# Patient Record
Sex: MALE | Race: WHITE | NOT HISPANIC OR LATINO | Employment: UNEMPLOYED | URBAN - METROPOLITAN AREA
[De-identification: names, ages, dates, MRNs, and addresses within clinical notes are randomized per-mention and may not be internally consistent; named-entity substitution may affect disease eponyms.]

---

## 2024-08-08 ENCOUNTER — TELEPHONE (OUTPATIENT)
Dept: FAMILY MEDICINE | Facility: CLINIC | Age: 59
End: 2024-08-08
Payer: MEDICARE

## 2024-08-09 ENCOUNTER — OFFICE VISIT (OUTPATIENT)
Dept: FAMILY MEDICINE | Facility: CLINIC | Age: 59
End: 2024-08-09
Payer: MEDICARE

## 2024-08-09 VITALS
TEMPERATURE: 98 F | DIASTOLIC BLOOD PRESSURE: 70 MMHG | OXYGEN SATURATION: 95 % | HEART RATE: 91 BPM | WEIGHT: 126.75 LBS | BODY MASS INDEX: 17.17 KG/M2 | SYSTOLIC BLOOD PRESSURE: 122 MMHG | HEIGHT: 72 IN

## 2024-08-09 DIAGNOSIS — F51.01 PRIMARY INSOMNIA: ICD-10-CM

## 2024-08-09 DIAGNOSIS — Z12.5 ENCOUNTER FOR SCREENING FOR MALIGNANT NEOPLASM OF PROSTATE: ICD-10-CM

## 2024-08-09 DIAGNOSIS — C61 PROSTATE CA: ICD-10-CM

## 2024-08-09 DIAGNOSIS — Z72.0 TOBACCO ABUSE: ICD-10-CM

## 2024-08-09 DIAGNOSIS — R79.9 ABNORMAL FINDING OF BLOOD CHEMISTRY, UNSPECIFIED: ICD-10-CM

## 2024-08-09 DIAGNOSIS — J44.9 CHRONIC OBSTRUCTIVE PULMONARY DISEASE, UNSPECIFIED COPD TYPE: Primary | ICD-10-CM

## 2024-08-09 PROCEDURE — 99999 PR PBB SHADOW E&M-EST. PATIENT-LVL III: CPT | Mod: PBBFAC,,, | Performed by: NURSE PRACTITIONER

## 2024-08-09 RX ORDER — CLONAZEPAM 1 MG/1
1 TABLET ORAL DAILY
Qty: 30 TABLET | Refills: 2 | Status: SHIPPED | OUTPATIENT
Start: 2024-08-09

## 2024-08-09 RX ORDER — CLONAZEPAM 1 MG/1
1 TABLET ORAL DAILY
COMMUNITY
End: 2024-08-09 | Stop reason: SDUPTHER

## 2024-08-16 ENCOUNTER — LAB VISIT (OUTPATIENT)
Dept: LAB | Facility: HOSPITAL | Age: 59
End: 2024-08-16
Attending: NURSE PRACTITIONER
Payer: MEDICARE

## 2024-08-16 DIAGNOSIS — R79.9 ABNORMAL FINDING OF BLOOD CHEMISTRY, UNSPECIFIED: ICD-10-CM

## 2024-08-16 DIAGNOSIS — C61 PROSTATE CA: ICD-10-CM

## 2024-08-16 DIAGNOSIS — Z72.0 TOBACCO ABUSE: ICD-10-CM

## 2024-08-16 DIAGNOSIS — Z12.5 ENCOUNTER FOR SCREENING FOR MALIGNANT NEOPLASM OF PROSTATE: ICD-10-CM

## 2024-08-16 LAB
ALBUMIN SERPL BCP-MCNC: 3.5 G/DL (ref 3.5–5.2)
ALP SERPL-CCNC: 84 U/L (ref 55–135)
ALT SERPL W/O P-5'-P-CCNC: 10 U/L (ref 10–44)
ANION GAP SERPL CALC-SCNC: 10 MMOL/L (ref 8–16)
AST SERPL-CCNC: 14 U/L (ref 10–40)
BASOPHILS # BLD AUTO: 0.04 K/UL (ref 0–0.2)
BASOPHILS NFR BLD: 0.7 % (ref 0–1.9)
BILIRUB SERPL-MCNC: 0.3 MG/DL (ref 0.1–1)
BUN SERPL-MCNC: 10 MG/DL (ref 6–20)
CALCIUM SERPL-MCNC: 9.3 MG/DL (ref 8.7–10.5)
CHLORIDE SERPL-SCNC: 105 MMOL/L (ref 95–110)
CHOLEST SERPL-MCNC: 188 MG/DL (ref 120–199)
CHOLEST/HDLC SERPL: 4.5 {RATIO} (ref 2–5)
CO2 SERPL-SCNC: 24 MMOL/L (ref 23–29)
COMPLEXED PSA SERPL-MCNC: 2.5 NG/ML (ref 0–4)
CREAT SERPL-MCNC: 1.1 MG/DL (ref 0.5–1.4)
DIFFERENTIAL METHOD BLD: ABNORMAL
EOSINOPHIL # BLD AUTO: 0.1 K/UL (ref 0–0.5)
EOSINOPHIL NFR BLD: 1.3 % (ref 0–8)
ERYTHROCYTE [DISTWIDTH] IN BLOOD BY AUTOMATED COUNT: 12.7 % (ref 11.5–14.5)
EST. GFR  (NO RACE VARIABLE): >60 ML/MIN/1.73 M^2
GLUCOSE SERPL-MCNC: 104 MG/DL (ref 70–110)
HCT VFR BLD AUTO: 45.7 % (ref 40–54)
HCV AB SERPL QL IA: NORMAL
HDLC SERPL-MCNC: 42 MG/DL (ref 40–75)
HDLC SERPL: 22.3 % (ref 20–50)
HGB BLD-MCNC: 15.4 G/DL (ref 14–18)
HIV 1+2 AB+HIV1 P24 AG SERPL QL IA: NORMAL
IMM GRANULOCYTES # BLD AUTO: 0.01 K/UL (ref 0–0.04)
IMM GRANULOCYTES NFR BLD AUTO: 0.2 % (ref 0–0.5)
LDLC SERPL CALC-MCNC: 124.6 MG/DL (ref 63–159)
LYMPHOCYTES # BLD AUTO: 1.2 K/UL (ref 1–4.8)
LYMPHOCYTES NFR BLD: 20.3 % (ref 18–48)
MCH RBC QN AUTO: 33.2 PG (ref 27–31)
MCHC RBC AUTO-ENTMCNC: 33.7 G/DL (ref 32–36)
MCV RBC AUTO: 99 FL (ref 82–98)
MONOCYTES # BLD AUTO: 0.7 K/UL (ref 0.3–1)
MONOCYTES NFR BLD: 11.2 % (ref 4–15)
NEUTROPHILS # BLD AUTO: 4 K/UL (ref 1.8–7.7)
NEUTROPHILS NFR BLD: 66.3 % (ref 38–73)
NONHDLC SERPL-MCNC: 146 MG/DL
NRBC BLD-RTO: 0 /100 WBC
PLATELET # BLD AUTO: 119 K/UL (ref 150–450)
PMV BLD AUTO: 11.6 FL (ref 9.2–12.9)
POTASSIUM SERPL-SCNC: 3.7 MMOL/L (ref 3.5–5.1)
PROT SERPL-MCNC: 6.5 G/DL (ref 6–8.4)
RBC # BLD AUTO: 4.64 M/UL (ref 4.6–6.2)
SODIUM SERPL-SCNC: 139 MMOL/L (ref 136–145)
TRIGL SERPL-MCNC: 107 MG/DL (ref 30–150)
WBC # BLD AUTO: 6.07 K/UL (ref 3.9–12.7)

## 2024-08-16 PROCEDURE — 80061 LIPID PANEL: CPT | Performed by: NURSE PRACTITIONER

## 2024-08-16 PROCEDURE — 86803 HEPATITIS C AB TEST: CPT | Performed by: NURSE PRACTITIONER

## 2024-08-16 PROCEDURE — 80053 COMPREHEN METABOLIC PANEL: CPT | Performed by: NURSE PRACTITIONER

## 2024-08-16 PROCEDURE — 84153 ASSAY OF PSA TOTAL: CPT | Performed by: NURSE PRACTITIONER

## 2024-08-16 PROCEDURE — 85025 COMPLETE CBC W/AUTO DIFF WBC: CPT | Performed by: NURSE PRACTITIONER

## 2024-08-16 PROCEDURE — 36415 COLL VENOUS BLD VENIPUNCTURE: CPT | Mod: PO | Performed by: NURSE PRACTITIONER

## 2024-08-16 PROCEDURE — 87389 HIV-1 AG W/HIV-1&-2 AB AG IA: CPT | Performed by: NURSE PRACTITIONER

## 2024-08-23 ENCOUNTER — LAB VISIT (OUTPATIENT)
Dept: LAB | Facility: HOSPITAL | Age: 59
End: 2024-08-23
Attending: FAMILY MEDICINE
Payer: MEDICARE

## 2024-08-23 ENCOUNTER — OFFICE VISIT (OUTPATIENT)
Dept: FAMILY MEDICINE | Facility: CLINIC | Age: 59
End: 2024-08-23
Attending: FAMILY MEDICINE
Payer: MEDICARE

## 2024-08-23 VITALS
HEIGHT: 72 IN | DIASTOLIC BLOOD PRESSURE: 76 MMHG | SYSTOLIC BLOOD PRESSURE: 120 MMHG | TEMPERATURE: 98 F | WEIGHT: 128.5 LBS | HEART RATE: 96 BPM | OXYGEN SATURATION: 93 % | BODY MASS INDEX: 17.41 KG/M2

## 2024-08-23 DIAGNOSIS — D69.6 THROMBOCYTOPENIA: ICD-10-CM

## 2024-08-23 DIAGNOSIS — Z85.46 HISTORY OF PROSTATE CANCER: ICD-10-CM

## 2024-08-23 DIAGNOSIS — I67.1 CEREBRAL ANEURYSM: ICD-10-CM

## 2024-08-23 DIAGNOSIS — D69.6 THROMBOCYTOPENIA: Primary | ICD-10-CM

## 2024-08-23 DIAGNOSIS — Z72.0 TOBACCO ABUSE: ICD-10-CM

## 2024-08-23 PROBLEM — M54.12 CERVICAL RADICULOPATHY: Status: ACTIVE | Noted: 2023-07-11

## 2024-08-23 PROBLEM — C61 PROSTATE CA: Status: RESOLVED | Noted: 2024-08-09 | Resolved: 2024-08-23

## 2024-08-23 PROBLEM — F17.200 TOBACCO DEPENDENCE SYNDROME: Status: ACTIVE | Noted: 2023-04-21

## 2024-08-23 PROBLEM — Z86.79 HISTORY OF CEREBRAL ANEURYSM: Status: ACTIVE | Noted: 2023-04-20

## 2024-08-23 PROBLEM — M47.816 LUMBAR SPONDYLOSIS: Status: ACTIVE | Noted: 2023-07-13

## 2024-08-23 PROBLEM — M54.16 LUMBAR RADICULOPATHY: Status: ACTIVE | Noted: 2023-07-11

## 2024-08-23 LAB
BASOPHILS # BLD AUTO: 0.04 K/UL (ref 0–0.2)
BASOPHILS NFR BLD: 0.6 % (ref 0–1.9)
DIFFERENTIAL METHOD BLD: ABNORMAL
EOSINOPHIL # BLD AUTO: 0.1 K/UL (ref 0–0.5)
EOSINOPHIL NFR BLD: 1.5 % (ref 0–8)
ERYTHROCYTE [DISTWIDTH] IN BLOOD BY AUTOMATED COUNT: 12.7 % (ref 11.5–14.5)
HCT VFR BLD AUTO: 49.3 % (ref 40–54)
HGB BLD-MCNC: 16.7 G/DL (ref 14–18)
IMM GRANULOCYTES # BLD AUTO: 0.02 K/UL (ref 0–0.04)
IMM GRANULOCYTES NFR BLD AUTO: 0.3 % (ref 0–0.5)
LYMPHOCYTES # BLD AUTO: 1.6 K/UL (ref 1–4.8)
LYMPHOCYTES NFR BLD: 24.6 % (ref 18–48)
MCH RBC QN AUTO: 33.2 PG (ref 27–31)
MCHC RBC AUTO-ENTMCNC: 33.9 G/DL (ref 32–36)
MCV RBC AUTO: 98 FL (ref 82–98)
MONOCYTES # BLD AUTO: 0.8 K/UL (ref 0.3–1)
MONOCYTES NFR BLD: 11.6 % (ref 4–15)
NEUTROPHILS # BLD AUTO: 4 K/UL (ref 1.8–7.7)
NEUTROPHILS NFR BLD: 61.4 % (ref 38–73)
NRBC BLD-RTO: 0 /100 WBC
PLATELET # BLD AUTO: 162 K/UL (ref 150–450)
PMV BLD AUTO: 11.6 FL (ref 9.2–12.9)
RBC # BLD AUTO: 5.03 M/UL (ref 4.6–6.2)
WBC # BLD AUTO: 6.54 K/UL (ref 3.9–12.7)

## 2024-08-23 PROCEDURE — 99999 PR PBB SHADOW E&M-EST. PATIENT-LVL III: CPT | Mod: PBBFAC,,, | Performed by: FAMILY MEDICINE

## 2024-08-23 PROCEDURE — 36415 COLL VENOUS BLD VENIPUNCTURE: CPT | Mod: PO | Performed by: FAMILY MEDICINE

## 2024-08-23 PROCEDURE — 85025 COMPLETE CBC W/AUTO DIFF WBC: CPT | Performed by: FAMILY MEDICINE

## 2024-08-23 NOTE — PROGRESS NOTES
Subjective:       Patient ID: Moe Loredo is a 59 y.o. male.    Chief Complaint: Establish Care    59 y old male with copd , hx of brain aneurysm, prostate ca here to get est . Not ready to quit smoking . He had aneurysm treated over 20 y ago . He last saw NS  last year . He did developed auditory hallucinations afterwards. He has not seen NS since . He also was diagnosed with prostate ca 10 y ago  . He sought alternative treatment with CBD oil,THC + Lemon oil . Currently in remission . He had a colonoscopy last year . He can't recall facility . No depression . Takes Clonazepam for insomnia . Eats very little . He doesn't take vaccinations. Recent lab results were  significant for thrombocytopenia       Review of Systems   Constitutional: Negative.    HENT: Negative.     Eyes: Negative.    Respiratory:  Positive for shortness of breath.    Cardiovascular: Negative.    Gastrointestinal: Negative.    Genitourinary: Negative.    Musculoskeletal: Negative.    Skin: Negative.    Hematological: Negative.        Objective:      Physical Exam  Constitutional:       General: He is not in acute distress.     Appearance: He is underweight. He is not diaphoretic.   HENT:      Head: Normocephalic and atraumatic.      Right Ear: External ear normal.      Left Ear: External ear normal.      Nose: Nose normal.      Mouth/Throat:      Pharynx: No oropharyngeal exudate.   Eyes:      General: No scleral icterus.        Right eye: No discharge.         Left eye: No discharge.      Conjunctiva/sclera: Conjunctivae normal.      Pupils: Pupils are equal, round, and reactive to light.   Neck:      Thyroid: No thyromegaly.      Vascular: No JVD.      Trachea: No tracheal deviation.   Cardiovascular:      Rate and Rhythm: Normal rate and regular rhythm.      Heart sounds: Normal heart sounds. No murmur heard.     No friction rub. No gallop.   Pulmonary:      Effort: Pulmonary effort is normal. No respiratory distress.      Breath sounds:  No stridor. Examination of the right-upper field reveals decreased breath sounds. Examination of the left-upper field reveals decreased breath sounds. Examination of the right-middle field reveals decreased breath sounds. Examination of the left-middle field reveals decreased breath sounds. Examination of the right-lower field reveals decreased breath sounds. Examination of the left-lower field reveals decreased breath sounds. Decreased breath sounds present. No wheezing or rales.   Chest:      Chest wall: No tenderness.   Abdominal:      General: Bowel sounds are normal. There is no distension.      Palpations: Abdomen is soft.      Tenderness: There is no abdominal tenderness. There is no guarding or rebound.   Musculoskeletal:         General: No tenderness. Normal range of motion.      Cervical back: Normal range of motion and neck supple.   Lymphadenopathy:      Cervical: No cervical adenopathy.   Skin:     General: Skin is warm and dry.      Coloration: Skin is not pale.      Findings: No erythema or rash.   Neurological:      Mental Status: He is alert and oriented to person, place, and time.      Cranial Nerves: No cranial nerve deficit.      Motor: No abnormal muscle tone.      Coordination: Coordination normal.      Deep Tendon Reflexes: Reflexes are normal and symmetric. Reflexes normal.   Psychiatric:         Behavior: Behavior normal.         Thought Content: Thought content normal.         Judgment: Judgment normal.         Assessment:      Moe was seen today for establish care.    Diagnoses and all orders for this visit:    Thrombocytopenia  -     CBC Auto Differential; Future    History of prostate cancer    Cerebral aneurysm    Tobacco abuse       Plan:   Labs   In remission   F.u with NS  Work on sc.Declined lung ca screening

## 2024-09-10 ENCOUNTER — PATIENT OUTREACH (OUTPATIENT)
Dept: ADMINISTRATIVE | Facility: HOSPITAL | Age: 59
End: 2024-09-10
Payer: MEDICARE

## 2024-09-10 NOTE — PROGRESS NOTES
Uploaded [facility] -COLONOSCOPY & PATHOLOGY - ; faxed to - DR. OWENS - 1x to request. Reminder set 2 weeks.

## 2024-11-21 DIAGNOSIS — F51.01 PRIMARY INSOMNIA: ICD-10-CM

## 2024-11-21 DIAGNOSIS — J44.9 CHRONIC OBSTRUCTIVE PULMONARY DISEASE, UNSPECIFIED COPD TYPE: ICD-10-CM

## 2024-11-21 RX ORDER — CLONAZEPAM 1 MG/1
1 TABLET ORAL DAILY
Qty: 30 TABLET | Refills: 2 | Status: SHIPPED | OUTPATIENT
Start: 2024-11-21

## 2024-11-21 NOTE — TELEPHONE ENCOUNTER
No care due was identified.  [facility] Embedded Care Due Messages. Reference number: [id number].   11/21/2024 10:25:59 AM CST

## 2024-11-21 NOTE — TELEPHONE ENCOUNTER
----- Message from Tia sent at 11/21/2024 10:19 AM CST -----  Who Called: Pt    What is the request in detail: Requesting call back to discuss New Rx  clonazePAM (KLONOPIN) 1 MG tablet 30 tablet 2 8/9/2024 - No  Sig - Route: Take 1 tablet (1 mg total) by mouth once daily. - Oral  Sent to pharmacy as: clonazePAM (KLONOPIN) 1 MG tablet  Class: Normal  Order: [id number]    fluticasone-umeclidin-vilanter (TRELEGY ELLIPTA) 200-62.5-25 mcg inhaler 180 each 1 8/9/2024 - No  Sig - Route: Inhale 1 puff into the lungs once daily. - Inhalation  Sent to pharmacy as: fluticasone-umeclidin-vilanter (TRELEGY ELLIPTA) 200-62.5-25 mcg inhaler  Class: Normal  Order: [id number]    [facility] [place], [place] - [address] [facility]  [address] [facility] [address]  Phone: [phone] Fax: [phone]    Can the clinic reply by [facility]? No    Best Call Back Number: [phone]      Additional Information:

## 2024-11-21 NOTE — TELEPHONE ENCOUNTER
----- Message from Tia sent at 11/21/2024 10:19 AM CST -----  Who Called: Pt    What is the request in detail: Requesting call back to discuss New Rx  clonazePAM (KLONOPIN) 1 MG tablet 30 tablet 2 8/9/2024 - No  Sig - Route: Take 1 tablet (1 mg total) by mouth once daily. - Oral  Sent to pharmacy as: clonazePAM (KLONOPIN) 1 MG tablet  Class: Normal  Order: [id number]    fluticasone-umeclidin-vilanter (TRELEGY ELLIPTA) 200-62.5-25 mcg inhaler 180 each 1 8/9/2024 - No  Sig - Route: Inhale 1 puff into the lungs once daily. - Inhalation  Sent to pharmacy as: fluticasone-umeclidin-vilanter (TRELEGY ELLIPTA) 200-62.5-25 mcg inhaler  Class: Normal  Order: [id number]    [facility] [place], [place] - [address] [facility] [address] [facility]  [address] [facility] [address]  Phone: [phone] Fax: [phone]    Can the clinic reply by [facility]? No    Best Call Back Number: [phone]      Additional Information:

## 2025-02-26 DIAGNOSIS — J44.9 CHRONIC OBSTRUCTIVE PULMONARY DISEASE, UNSPECIFIED COPD TYPE: ICD-10-CM

## 2025-02-26 NOTE — TELEPHONE ENCOUNTER
No care due was identified.  [facility] Embedded Care Due Messages. Reference number: [id number].   2/26/2025 10:11:36 AM CST

## 2025-02-26 NOTE — TELEPHONE ENCOUNTER
----- Message from Dara sent at 2/26/2025 10:04 AM CST -----  Please refill the medication(s) listed below.Please call the patient when the prescription(s) is ready for  at this phone GAYE Conti [[id number]]  Medication #5iljpipzimwj-fymldhpui-tiylukia (TRELEGY ELLIPTA) 200-62.5-25 mcg inhalerMedication #2clonazePAM (KLONOPIN) 1 MG tablet  Preferred Pharmacy:[facility] [address] [address]Lcoli: [phone] Fax: [phone]  Would the patient rather a call back or a response via [facility]? call Best Call Back Number:Telephone Information:Mobile          [phone]

## 2025-02-28 DIAGNOSIS — F51.01 PRIMARY INSOMNIA: ICD-10-CM

## 2025-02-28 RX ORDER — CLONAZEPAM 1 MG/1
1 TABLET ORAL DAILY
Qty: 30 TABLET | Refills: 0 | Status: SHIPPED | OUTPATIENT
Start: 2025-02-28

## 2025-02-28 NOTE — TELEPHONE ENCOUNTER
No care due was identified.  [facility] Embedded Care Due Messages. Reference number: [id number].   2/28/2025 11:48:03 AM CST

## 2025-02-28 NOTE — TELEPHONE ENCOUNTER
----- Message from Dio sent at 2/28/2025 11:35 AM CST -----  Contact: Moe  Type:  RX Refill RequestWho Called: Moe Refill or New Rx:Refill RX Name and Strength:clonazePAM (KLONOPIN) 1 MG tabletHow is the patient currently taking it? (ex. 1XDay):Once Daily Is this a 30 day or 90 day RX:30Preferred Pharmacy with phone number:[facility] [place], [place] - [address] [place], [place] [id number] [facility] [address]Yqdjx: [phone] Fax: [phone] or Mail Order:Local Ordering Provider:Dr. Hernandez Would the patient rather a call back or a response via [facility]? Call back Best Call Back Number:[phone] Information:

## 2025-03-20 ENCOUNTER — OFFICE VISIT (OUTPATIENT)
Dept: FAMILY MEDICINE | Facility: CLINIC | Age: 60
End: 2025-03-20
Attending: FAMILY MEDICINE
Payer: MEDICARE

## 2025-03-20 ENCOUNTER — LAB VISIT (OUTPATIENT)
Dept: LAB | Facility: HOSPITAL | Age: 60
End: 2025-03-20
Attending: FAMILY MEDICINE
Payer: MEDICARE

## 2025-03-20 VITALS
HEIGHT: 72 IN | BODY MASS INDEX: 17.11 KG/M2 | SYSTOLIC BLOOD PRESSURE: 124 MMHG | HEART RATE: 91 BPM | OXYGEN SATURATION: 95 % | WEIGHT: 126.31 LBS | DIASTOLIC BLOOD PRESSURE: 82 MMHG | TEMPERATURE: 97 F

## 2025-03-20 DIAGNOSIS — R23.3 EASY BRUISING: ICD-10-CM

## 2025-03-20 DIAGNOSIS — F51.01 PRIMARY INSOMNIA: ICD-10-CM

## 2025-03-20 DIAGNOSIS — Z86.79 HISTORY OF CEREBRAL ANEURYSM: ICD-10-CM

## 2025-03-20 DIAGNOSIS — Z72.0 TOBACCO ABUSE: ICD-10-CM

## 2025-03-20 DIAGNOSIS — R23.3 EASY BRUISING: Primary | ICD-10-CM

## 2025-03-20 LAB
BASOPHILS # BLD AUTO: 0.04 K/UL (ref 0–0.2)
BASOPHILS NFR BLD: 0.6 % (ref 0–1.9)
DIFFERENTIAL METHOD BLD: ABNORMAL
EOSINOPHIL # BLD AUTO: 0.1 K/UL (ref 0–0.5)
EOSINOPHIL NFR BLD: 0.7 % (ref 0–8)
ERYTHROCYTE [DISTWIDTH] IN BLOOD BY AUTOMATED COUNT: 13.1 % (ref 11.5–14.5)
HCT VFR BLD AUTO: 52.5 % (ref 40–54)
HGB BLD-MCNC: 17.3 G/DL (ref 14–18)
IMM GRANULOCYTES # BLD AUTO: 0.03 K/UL (ref 0–0.04)
IMM GRANULOCYTES NFR BLD AUTO: 0.4 % (ref 0–0.5)
LYMPHOCYTES # BLD AUTO: 1.6 K/UL (ref 1–4.8)
LYMPHOCYTES NFR BLD: 22.5 % (ref 18–48)
MCH RBC QN AUTO: 32.8 PG (ref 27–31)
MCHC RBC AUTO-ENTMCNC: 33 G/DL (ref 32–36)
MCV RBC AUTO: 99 FL (ref 82–98)
MONOCYTES # BLD AUTO: 0.7 K/UL (ref 0.3–1)
MONOCYTES NFR BLD: 9.4 % (ref 4–15)
NEUTROPHILS # BLD AUTO: 4.7 K/UL (ref 1.8–7.7)
NEUTROPHILS NFR BLD: 66.4 % (ref 38–73)
NRBC BLD-RTO: 0 /100 WBC
PLATELET # BLD AUTO: 146 K/UL (ref 150–450)
PMV BLD AUTO: 11.2 FL (ref 9.2–12.9)
RBC # BLD AUTO: 5.28 M/UL (ref 4.6–6.2)
WBC # BLD AUTO: 7.02 K/UL (ref 3.9–12.7)

## 2025-03-20 PROCEDURE — G2211 COMPLEX E/M VISIT ADD ON: HCPCS | Mod: S$GLB,,, | Performed by: FAMILY MEDICINE

## 2025-03-20 PROCEDURE — 85730 THROMBOPLASTIN TIME PARTIAL: CPT | Performed by: FAMILY MEDICINE

## 2025-03-20 PROCEDURE — 85025 COMPLETE CBC W/AUTO DIFF WBC: CPT | Performed by: FAMILY MEDICINE

## 2025-03-20 PROCEDURE — 3079F DIAST BP 80-89 MM HG: CPT | Mod: CPTII,S$GLB,, | Performed by: FAMILY MEDICINE

## 2025-03-20 PROCEDURE — 3008F BODY MASS INDEX DOCD: CPT | Mod: CPTII,S$GLB,, | Performed by: FAMILY MEDICINE

## 2025-03-20 PROCEDURE — 85610 PROTHROMBIN TIME: CPT | Performed by: FAMILY MEDICINE

## 2025-03-20 PROCEDURE — 3074F SYST BP LT 130 MM HG: CPT | Mod: CPTII,S$GLB,, | Performed by: FAMILY MEDICINE

## 2025-03-20 PROCEDURE — 36415 COLL VENOUS BLD VENIPUNCTURE: CPT | Mod: PO | Performed by: FAMILY MEDICINE

## 2025-03-20 PROCEDURE — 99999 PR PBB SHADOW E&M-EST. PATIENT-LVL III: CPT | Mod: PBBFAC,,, | Performed by: FAMILY MEDICINE

## 2025-03-20 PROCEDURE — 1160F RVW MEDS BY RX/DR IN RCRD: CPT | Mod: CPTII,S$GLB,, | Performed by: FAMILY MEDICINE

## 2025-03-20 PROCEDURE — 1159F MED LIST DOCD IN RCRD: CPT | Mod: CPTII,S$GLB,, | Performed by: FAMILY MEDICINE

## 2025-03-20 PROCEDURE — 99214 OFFICE O/P EST MOD 30 MIN: CPT | Mod: S$GLB,,, | Performed by: FAMILY MEDICINE

## 2025-03-20 RX ORDER — CLONAZEPAM 1 MG/1
1 TABLET ORAL DAILY
Qty: 30 TABLET | Refills: 5 | Status: SHIPPED | OUTPATIENT
Start: 2025-03-30

## 2025-03-20 NOTE — PROGRESS NOTES
Subjective:       Patient ID: Moe Loredo is a 60 y.o. male.    Chief Complaint: Follow-up    60 y old male with tobacco abuse, brain aneurysm , hx of prostate ca and insomnia here for follow up  . Doing well. Not ready to quit smoking . Declined Lung ca screening CT . He has seen Dr Ritter for aneurysm fe.u . Denies any urological problems . Takes clonazepam for insomnia. Denies  memory loss, falls , etc. Also with  easy bruisability . He will like to make sure he doesn't have leukemia .       Review of Systems   Constitutional: Negative.    HENT: Negative.     Eyes: Negative.    Respiratory: Negative.     Cardiovascular: Negative.    Gastrointestinal: Negative.    Genitourinary: Negative.    Musculoskeletal: Negative.    Skin: Negative.    Hematological: Negative.        Objective:      Physical Exam  Constitutional:       General: He is not in acute distress.     Appearance: He is well-developed. He is not diaphoretic.   HENT:      Head: Normocephalic and atraumatic.      Right Ear: External ear normal.      Left Ear: External ear normal.      Nose: Nose normal.      Mouth/Throat:      Pharynx: No oropharyngeal exudate.   Eyes:      General: No scleral icterus.        Right eye: No discharge.         Left eye: No discharge.      Conjunctiva/sclera: Conjunctivae normal.      Pupils: Pupils are equal, round, and reactive to light.   Neck:      Thyroid: No thyromegaly.      Vascular: No JVD.      Trachea: No tracheal deviation.   Cardiovascular:      Rate and Rhythm: Normal rate and regular rhythm.      Heart sounds: Normal heart sounds. No murmur heard.     No friction rub. No gallop.   Pulmonary:      Effort: Pulmonary effort is normal. No respiratory distress.      Breath sounds: No stridor. Examination of the right-upper field reveals decreased breath sounds. Examination of the left-upper field reveals decreased breath sounds. Examination of the left-middle field reveals decreased breath sounds. Examination  of the right-lower field reveals decreased breath sounds. Examination of the left-lower field reveals decreased breath sounds. Decreased breath sounds present. No wheezing or rales.   Chest:      Chest wall: No tenderness.   Abdominal:      General: Bowel sounds are normal. There is no distension.      Palpations: Abdomen is soft.      Tenderness: There is no abdominal tenderness. There is no guarding or rebound.   Musculoskeletal:         General: No tenderness. Normal range of motion.      Cervical back: Normal range of motion and neck supple.   Lymphadenopathy:      Cervical: No cervical adenopathy.   Skin:     General: Skin is warm and dry.      Coloration: Skin is not pale.      Findings: No erythema or rash.   Neurological:      Mental Status: He is alert and oriented to person, place, and time.      Cranial Nerves: No cranial nerve deficit.      Motor: No abnormal muscle tone.      Coordination: Coordination normal.      Deep Tendon Reflexes: Reflexes are normal and symmetric. Reflexes normal.   Psychiatric:         Behavior: Behavior normal.         Thought Content: Thought content normal.         Judgment: Judgment normal.         Assessment:     Moe was seen today for follow-up.    Diagnoses and all orders for this visit:    Easy bruising  -     CBC Auto Differential; Future  -     Protime-INR; Future  -     APTT; Future    Primary insomnia  -     clonazePAM (KLONOPIN) 1 MG tablet; Take 1 tablet (1 mg total) by mouth once daily.    Tobacco abuse    History of cerebral aneurysm           Plan:      1.- labs   2.- Stable . Med rf   3.- Work on smoking cessation   4.- Stable . F.u with NS

## 2025-03-21 ENCOUNTER — RESULTS FOLLOW-UP (OUTPATIENT)
Dept: FAMILY MEDICINE | Facility: CLINIC | Age: 60
End: 2025-03-21

## 2025-03-21 ENCOUNTER — TELEPHONE (OUTPATIENT)
Dept: FAMILY MEDICINE | Facility: CLINIC | Age: 60
End: 2025-03-21
Payer: MEDICARE

## 2025-03-21 LAB
APTT PPP: 36.7 SEC (ref 21–32)
INR PPP: 1.1 (ref 0.8–1.2)
PROTHROMBIN TIME: 12.4 SEC (ref 9–12.5)

## 2025-03-21 NOTE — TELEPHONE ENCOUNTER
----- Message from Natasha sent at 3/21/2025 12:59 PM CDT -----  Contact: GAYE GRIFFIN [[id number]]  ..Type:  Patient Requesting CallWho Called:GAYE GRIFFIN [[id number]]Does the patient know what this is regarding?:pt is requesting a call back from the provider or nurse to discuss his medical records Would the patient rather a call back or a response via [facility]? [facility] Call Back Number:[phone] Information:  
Attempted to contact pt. No answer.   
18

## 2025-03-24 ENCOUNTER — TELEPHONE (OUTPATIENT)
Dept: FAMILY MEDICINE | Facility: CLINIC | Age: 60
End: 2025-03-24
Payer: MEDICARE

## 2025-03-24 DIAGNOSIS — R23.3 EASY BRUISING: Primary | ICD-10-CM

## 2025-03-24 NOTE — TELEPHONE ENCOUNTER
----- Message from Ani sent at 3/24/2025 12:11 PM CDT -----  Contact: Moe  Type:  Patient Returning CallWho Called:Lauri Left Message for Patient:NurseDoes the patient know what this is regarding?:missed callWould the patient rather a call back or a response via [facility]? Call [facility] Call Back Number:  Additional Information: Moe called in and stated he had a missed callanksL

## 2025-03-24 NOTE — TELEPHONE ENCOUNTER
----- Message from Nurse Erica sent at 3/24/2025  9:35 AM CDT -----  Attempted to call pt to offer Hemonc referral.  No answer, no VM available.  Will you please drop an internal referral and if we can get him on the phone we can schedule?  ----- Message -----  From: Benita Parsons MD  Sent: 3/24/2025   8:59 AM CDT  To: Giacomo Joy Staff    Slightly abnormal clotting marker. Please offer hematology consult   ----- Message -----  From: Delmar, Avisena Lab Interface  Sent: 3/20/2025  10:46 PM CDT  To: Benita Parsons MD

## 2025-03-28 ENCOUNTER — TELEPHONE (OUTPATIENT)
Dept: HEMATOLOGY/ONCOLOGY | Facility: CLINIC | Age: 60
End: 2025-03-28
Payer: MEDICARE

## 2025-03-28 NOTE — TELEPHONE ENCOUNTER
Attempted to call pt in reference to Hem/Onc appt scheduled on 4/14 has been canceled and will need to be r/s d/t being scheduled incorrectly on Onc schedule. No answer v/m not setup.

## 2025-04-04 ENCOUNTER — TELEPHONE (OUTPATIENT)
Dept: HEMATOLOGY/ONCOLOGY | Facility: CLINIC | Age: 60
End: 2025-04-04
Payer: MEDICARE

## 2025-04-04 DIAGNOSIS — R23.3 EASY BRUISING: Primary | ICD-10-CM

## 2025-04-04 NOTE — TELEPHONE ENCOUNTER
----- Message from Nurse Simmons sent at 4/4/2025  2:32 PM CDT -----  Contact: Moe Griffiths call to [facility] appt.  ----- Message -----  From: Shiloh Melgoza  Sent: 4/4/2025   2:08 PM CDT  To: Dorothea Quiles Staff    Type:  Patient Returning CallWho Called:Lauri Left Message for Patient:Zohreh Marie LPNDoes the patient know what this is regarding?:schedule an appointmentWould the patient rather a call back or a response via [facility]? Call [facility] Call Back Number:[phone]!

## 2025-04-28 ENCOUNTER — LAB VISIT (OUTPATIENT)
Dept: LAB | Facility: HOSPITAL | Age: 60
End: 2025-04-28
Attending: NURSE PRACTITIONER
Payer: MEDICARE

## 2025-04-28 DIAGNOSIS — R23.3 EASY BRUISING: ICD-10-CM

## 2025-04-28 LAB
ABSOLUTE EOSINOPHIL (OHS): 0.06 K/UL
ABSOLUTE MONOCYTE (OHS): 0.6 K/UL (ref 0.3–1)
ABSOLUTE NEUTROPHIL COUNT (OHS): 4.33 K/UL (ref 1.8–7.7)
BASOPHILS # BLD AUTO: 0.03 K/UL
BASOPHILS NFR BLD AUTO: 0.5 %
ERYTHROCYTE [DISTWIDTH] IN BLOOD BY AUTOMATED COUNT: 13 % (ref 11.5–14.5)
HCT VFR BLD AUTO: 45.4 % (ref 40–54)
HGB BLD-MCNC: 15.5 GM/DL (ref 14–18)
IMM GRANULOCYTES # BLD AUTO: 0.01 K/UL (ref 0–0.04)
IMM GRANULOCYTES NFR BLD AUTO: 0.2 % (ref 0–0.5)
INR PPP: 1 (ref 0.8–1.2)
LYMPHOCYTES # BLD AUTO: 1.12 K/UL (ref 1–4.8)
MCH RBC QN AUTO: 33.8 PG (ref 27–31)
MCHC RBC AUTO-ENTMCNC: 34.1 G/DL (ref 32–36)
MCV RBC AUTO: 99 FL (ref 82–98)
NUCLEATED RBC (/100WBC) (OHS): 0 /100 WBC
PLATELET # BLD AUTO: 144 K/UL (ref 150–450)
PMV BLD AUTO: 10.4 FL (ref 9.2–12.9)
PROTHROMBIN TIME: 11.4 SECONDS (ref 9–12.5)
RBC # BLD AUTO: 4.59 M/UL (ref 4.6–6.2)
RELATIVE EOSINOPHIL (OHS): 1 %
RELATIVE LYMPHOCYTE (OHS): 18.2 % (ref 18–48)
RELATIVE MONOCYTE (OHS): 9.8 % (ref 4–15)
RELATIVE NEUTROPHIL (OHS): 70.3 % (ref 38–73)
WBC # BLD AUTO: 6.15 K/UL (ref 3.9–12.7)

## 2025-04-28 PROCEDURE — 36415 COLL VENOUS BLD VENIPUNCTURE: CPT | Mod: PO

## 2025-04-28 PROCEDURE — 85610 PROTHROMBIN TIME: CPT

## 2025-04-28 PROCEDURE — 85025 COMPLETE CBC W/AUTO DIFF WBC: CPT

## 2025-04-28 PROCEDURE — 85730 THROMBOPLASTIN TIME PARTIAL: CPT

## 2025-04-30 NOTE — PROGRESS NOTES
Subjective:       Patient ID: Moe Loredo is a 60 y.o. male.    Chief Complaint:   1. Easy bruising  Ambulatory referral/consult to Hematology / Oncology      2. Thrombocytopenia          Current Treatment:       Treatment History:    HPI: This is a 60 year old  male with osteoarthritis, brain aneurysm s/p repair, COPD, schwannoma of spinal cord, and neuropathy who is seen in Hem/Onc after being referred by Dr. Benita Parsons for easy bruising.     Interval History: Patient presents for follow up on labs. He presents alone and reports fatigue, back pain, easy bruising, and SOB from COPD. He states easy bruising started over the last year. He states he never used to bruise but notices that if brushes up against something, he develops a bruise pretty quickly. He reports history of polycythemia vera; however, blood results in Epic do not indicate elevated RBC. Discussed potential causes of polycythemia to include COPD and tobacco use. He admits to smoking 1.5 packs a day since he was 14 years old. Will check CBC, peripheral smear, vWF, vWAg, mixing studies, B12, folate, TSH, and Factors 8, 9, 11, 12. Will have him follow up in 2 weeks with results.     Reviewed labs with patient:   CBC:   Recent Labs   Lab 04/28/25  1228   WBC 6.15   RBC 4.59 L   HGB 15.5   HCT 45.4   Platelet Count 144 L   MCV 99 H   MCH 33.8 H   MCHC 34.1     CMP:  Recent Labs   Lab 08/16/24  1422   Glucose 104   Calcium 9.3   Albumin 3.5   Total Protein 6.5   Sodium 139   Potassium 3.7   CO2 24   Chloride 105   BUN 10   Creatinine 1.1   Alkaline Phosphatase 84   ALT 10   AST 14   Total Bilirubin 0.3     Social History[1]  Past Medical History:   Diagnosis Date    Aneurysm, cerebral     COPD (chronic obstructive pulmonary disease)     Prostate CA     Seizures      Family History   Problem Relation Name Age of Onset    Cancer Mother          cervical    Cancer Father          prostate, lung and leukemia     Past Surgical History:    Procedure Laterality Date    BRAIN SURGERY      EYE SURGERY Right     KNEE SURGERY Left      Review of Systems   Constitutional:  Positive for fatigue. Negative for appetite change.   HENT:  Negative for mouth sores, rhinorrhea and sore throat.    Eyes: Negative.    Respiratory:  Positive for shortness of breath.    Cardiovascular: Negative.    Gastrointestinal:  Negative for constipation, diarrhea, nausea and vomiting.   Endocrine: Negative.    Genitourinary: Negative.    Musculoskeletal:  Positive for back pain (5/10).   Integumentary:  Negative.   Allergic/Immunologic: Negative.    Neurological:  Negative for weakness and numbness.   Hematological:  Bruises/bleeds easily.   Psychiatric/Behavioral:  Positive for sleep disturbance.        Medication List with Changes/Refills   Current Medications    CLONAZEPAM (KLONOPIN) 1 MG TABLET    Take 1 tablet (1 mg total) by mouth once daily.    FLUTICASONE-UMECLIDIN-VILANTER (TRELEGY ELLIPTA) 200-62.5-25 MCG INHALER    Inhale 1 puff into the lungs once daily.     Objective:     Vitals:    05/01/25 1308   BP: 103/70   Pulse: 73     Physical Exam  Vitals reviewed.   Constitutional:       Appearance: Normal appearance.   HENT:      Head: Normocephalic.      Mouth/Throat:      Comments:     Eyes:      Extraocular Movements: Extraocular movements intact.      Pupils: Pupils are equal, round, and reactive to light.   Cardiovascular:      Rate and Rhythm: Normal rate and regular rhythm.      Heart sounds: Normal heart sounds.   Pulmonary:      Effort: Pulmonary effort is normal.      Breath sounds: Normal breath sounds.   Abdominal:      General: Bowel sounds are normal.      Palpations: Abdomen is soft.      Comments: rounded     Genitourinary:     Comments: deferred    Musculoskeletal:         General: Normal range of motion.      Cervical back: Normal range of motion and neck supple.   Skin:     General: Skin is warm and dry.   Neurological:      Mental Status: He is alert  and oriented to person, place, and time.   Psychiatric:         Behavior: Behavior normal.         Thought Content: Thought content normal.          (0) Fully active, able to carry on all predisease performance without restriction  Assessment:     Problem List Items Addressed This Visit    None  Visit Diagnoses         Easy bruising    -  Primary      Thrombocytopenia              Plan:     Easy bruising  -     Ambulatory referral/consult to Hematology / Oncology    Thrombocytopenia    Labs reviewed.   Check CBC, peripheral smear, Factor VIII, IX, XI, XII, vWF, vWAg, mixing studies, blue top plt, B12, folate, TSH.   Follow up in 2 weeks with  results of workup .    Route Chart for Scheduling    Med Onc Chart Routing      Follow up with physician    Follow up with CARINE 2 weeks. with blood results   Infusion scheduling note    Injection scheduling note    Labs CBC, vitamin B12, TSH, folate, other and pathologist interp. differential   Scheduling:  Preferred lab:  Lab interval:  and Factor VIII, IX, XI, XII, vWF, vWAg, mixing studies, blue top plt today   Imaging None      Pharmacy appointment No pharmacy appointment needed      Other referrals       No additional referrals needed              I will review assessment/plan with collaborating physician.      TEOFILO Pitts         [1]   Social History  Socioeconomic History    Marital status: Single   Occupational History    Occupation:    Tobacco Use    Smoking status: Every Day     Current packs/day: 1.00     Average packs/day: 1 pack/day for 40.3 years (40.3 ttl pk-yrs)     Types: Cigarettes     Start date: 1985     Passive exposure: Never    Smokeless tobacco: Never   Substance and Sexual Activity    Alcohol use: Not Currently    Drug use: Never    Sexual activity: Not Currently   Social History Narrative    ** Merged History Encounter **

## 2025-05-01 ENCOUNTER — LAB VISIT (OUTPATIENT)
Dept: LAB | Facility: HOSPITAL | Age: 60
End: 2025-05-01
Attending: INTERNAL MEDICINE
Payer: MEDICARE

## 2025-05-01 ENCOUNTER — OFFICE VISIT (OUTPATIENT)
Dept: HEMATOLOGY/ONCOLOGY | Facility: CLINIC | Age: 60
End: 2025-05-01
Attending: FAMILY MEDICINE
Payer: MEDICARE

## 2025-05-01 VITALS
OXYGEN SATURATION: 96 % | HEART RATE: 73 BPM | HEIGHT: 72 IN | WEIGHT: 125.81 LBS | DIASTOLIC BLOOD PRESSURE: 70 MMHG | BODY MASS INDEX: 17.04 KG/M2 | SYSTOLIC BLOOD PRESSURE: 103 MMHG

## 2025-05-01 DIAGNOSIS — D89.89 OTHER SPECIFIED DISORDERS INVOLVING THE IMMUNE MECHANISM, NOT ELSEWHERE CLASSIFIED: ICD-10-CM

## 2025-05-01 DIAGNOSIS — D69.6 THROMBOCYTOPENIA: ICD-10-CM

## 2025-05-01 DIAGNOSIS — R23.3 EASY BRUISING: Primary | ICD-10-CM

## 2025-05-01 DIAGNOSIS — R23.3 EASY BRUISING: ICD-10-CM

## 2025-05-01 LAB
ABSOLUTE EOSINOPHIL (OHS): 0.06 K/UL
ABSOLUTE MONOCYTE (OHS): 0.54 K/UL (ref 0.3–1)
ABSOLUTE NEUTROPHIL COUNT (OHS): 3.88 K/UL (ref 1.8–7.7)
BASOPHILS # BLD AUTO: 0.03 K/UL
BASOPHILS NFR BLD AUTO: 0.5 %
ERYTHROCYTE [DISTWIDTH] IN BLOOD BY AUTOMATED COUNT: 12.9 % (ref 11.5–14.5)
HCT VFR BLD AUTO: 48.2 % (ref 40–54)
HGB BLD-MCNC: 16 GM/DL (ref 14–18)
IMM GRANULOCYTES # BLD AUTO: 0.01 K/UL (ref 0–0.04)
IMM GRANULOCYTES NFR BLD AUTO: 0.2 % (ref 0–0.5)
LYMPHOCYTES # BLD AUTO: 1.14 K/UL (ref 1–4.8)
MCH RBC QN AUTO: 33.1 PG (ref 27–31)
MCHC RBC AUTO-ENTMCNC: 33.2 G/DL (ref 32–36)
MCV RBC AUTO: 100 FL (ref 82–98)
NUCLEATED RBC (/100WBC) (OHS): 0 /100 WBC
PLATELET # BLD AUTO: 139 K/UL (ref 150–450)
PLATELET # BLD AUTO: 86 K/UL (ref 150–450)
PMV BLD AUTO: 10.3 FL (ref 9.2–12.9)
PMV BLD AUTO: 10.4 FL (ref 9.2–12.9)
RBC # BLD AUTO: 4.83 M/UL (ref 4.6–6.2)
RELATIVE EOSINOPHIL (OHS): 1.1 %
RELATIVE LYMPHOCYTE (OHS): 20.1 % (ref 18–48)
RELATIVE MONOCYTE (OHS): 9.5 % (ref 4–15)
RELATIVE NEUTROPHIL (OHS): 68.6 % (ref 38–73)
TSH SERPL-ACNC: 0.97 UIU/ML (ref 0.4–4)
WBC # BLD AUTO: 5.66 K/UL (ref 3.9–12.7)

## 2025-05-01 PROCEDURE — 82746 ASSAY OF FOLIC ACID SERUM: CPT

## 2025-05-01 PROCEDURE — 84443 ASSAY THYROID STIM HORMONE: CPT

## 2025-05-01 PROCEDURE — 85246 CLOT FACTOR VIII VW ANTIGEN: CPT

## 2025-05-01 PROCEDURE — 36415 COLL VENOUS BLD VENIPUNCTURE: CPT

## 2025-05-01 PROCEDURE — 85397 CLOTTING FUNCT ACTIVITY: CPT

## 2025-05-01 PROCEDURE — 99999 PR PBB SHADOW E&M-EST. PATIENT-LVL III: CPT | Mod: PBBFAC,,, | Performed by: NURSE PRACTITIONER

## 2025-05-01 PROCEDURE — 85049 AUTOMATED PLATELET COUNT: CPT

## 2025-05-01 PROCEDURE — 82607 VITAMIN B-12: CPT

## 2025-05-01 PROCEDURE — 85250 CLOT FACTOR IX PTC/CHRSTMAS: CPT

## 2025-05-01 PROCEDURE — 85280 CLOT FACTOR XII HAGEMAN: CPT

## 2025-05-01 PROCEDURE — 85270 CLOT FACTOR XI PTA: CPT

## 2025-05-01 PROCEDURE — 85025 COMPLETE CBC W/AUTO DIFF WBC: CPT

## 2025-05-01 PROCEDURE — 85240 CLOT FACTOR VIII AHG 1 STAGE: CPT

## 2025-05-02 ENCOUNTER — RESULTS FOLLOW-UP (OUTPATIENT)
Dept: HEMATOLOGY/ONCOLOGY | Facility: CLINIC | Age: 60
End: 2025-05-02

## 2025-05-02 ENCOUNTER — TELEPHONE (OUTPATIENT)
Dept: HEMATOLOGY/ONCOLOGY | Facility: CLINIC | Age: 60
End: 2025-05-02
Payer: MEDICARE

## 2025-05-02 LAB
APTT PPP: 32.5 SECONDS (ref 21–32)
FACT VIII ACT/NOR PPP: 148 % (ref 60–170)
FACT XII ACT/NOR PPP: 46 %
FOLATE SERPL-MCNC: 5.9 NG/ML (ref 4–24)
VIT B12 SERPL-MCNC: 701 PG/ML (ref 210–950)

## 2025-05-02 NOTE — TELEPHONE ENCOUNTER
The lab called to report that the patients PTT was 32.5 and it does not meet criteria  for the mixing study. It has to be greater than 42.    Provider notified

## 2025-05-03 LAB
VWF AG ACT/NOR PPP IA: 144 % (ref 55–200)
VWF:AC ACT/NOR PPP IA: 130 % (ref 55–200)

## 2025-05-05 LAB
FACT IX ACT/NOR PPP: 112 % (ref 65–145)
FACT XI ACT/NOR PPP: 114 % (ref 55–145)

## 2025-05-13 ENCOUNTER — TELEPHONE (OUTPATIENT)
Dept: FAMILY MEDICINE | Facility: CLINIC | Age: 60
End: 2025-05-13
Payer: MEDICARE

## 2025-05-13 NOTE — TELEPHONE ENCOUNTER
Copied from CRM #[id number]. Topic: General Inquiry - Test Results  >> May 13, 2025  2:25 PM Enriqueta wrote:  Type:  Test Results    Who Called: pt  Name of Test (Lab/Mammo/Etc): labs  Date of Test: na  Ordering Provider: idalmis  Where the test was performed: na  Would the patient rather a call back or a response via [facility]? call  Best Call Back Number: [phone]   Additional Information:  requesting to speak with office as he had labs two weeks ago and never received a call about results

## 2025-05-13 NOTE — TELEPHONE ENCOUNTER
Spoke to pt. He was calling to get results from Hematology. I advised him that I would send a message to ordering doctor's staff. He verbalized understanding.     He also wanted a copy of Dr. Hernandez's labs. Will have at the front for .

## 2025-05-26 PROBLEM — D69.6 THROMBOCYTOPENIA: Status: ACTIVE | Noted: 2025-05-26

## 2025-05-26 PROBLEM — R23.3 EASY BRUISING: Status: ACTIVE | Noted: 2025-05-26

## 2025-05-26 NOTE — PROGRESS NOTES
"Subjective:       Patient ID: Moe Loredo is a 60 y.o. male.    Chief Complaint:   1. Easy bruising        2. Thrombocytopenia  CBC Auto Differential    Peripheral Smear Review for Hemolysis or Low Platelets    US Abdomen Complete    Lupus Anticoagulant        Current Treatment:       Treatment History:    HPI: This is a 60 year old  male with osteoarthritis, brain aneurysm s/p repair, COPD, schwannoma of spinal cord, and neuropathy who is seen in Hem/Onc after being referred by Dr. Benita Parsons for easy bruising. He was seen initially in 5/2025 with complaints of fatigue, back pain, easy bruising, and SOB from COPD. He stated easy bruising started over the last year. He stated he never used to bruise but noticed that if brushed up against something, he developed a bruise pretty quickly. He reported history of polycythemia vera; however, blood results in Epic do not indicate elevated RBC.     He admited to smoking 1.5 packs a day since he was 14 years old.     Interval History: Patient presents for follow up on labs. He presents alone and reports having started taking a number of supplements since his last visit. He has also started taking a slow release iron supplement and experienced a "dramatic" improvement in his breathing. vWF, Factor VIII, IX, XI, XII WNL.   Mixing study not done due to mildly prolonged PTT. Will check CBC with peripheral smear, lupus anticoagulant and have US abdomen done to evaluate. Will have him follow up in 2 weeks with results.     Reviewed labs with patient:   CBC:   Recent Labs   Lab 05/01/25  1425   WBC 5.66   RBC 4.83   HGB 16.0   HCT 48.2   Platelet Count 86 L  139 L    H   MCH 33.1 H   MCHC 33.2     CMP:  Recent Labs   Lab 08/16/24  1422   Glucose 104   Calcium 9.3   Albumin 3.5   Total Protein 6.5   Sodium 139   Potassium 3.7   CO2 24   Chloride 105   BUN 10   Creatinine 1.1   Alkaline Phosphatase 84   ALT 10   AST 14   Total Bilirubin 0.3     Lab " Results   Component Value Date    HGQYGPHR43 701 05/01/2025     Lab Results   Component Value Date    FOLATE 5.9 05/01/2025     Lab Results   Component Value Date    APTT 32.5 (H) 05/01/2025     Lab Results   Component Value Date    TSH 0.972 05/01/2025     Social History[1]  Past Medical History:   Diagnosis Date    Aneurysm, cerebral     COPD (chronic obstructive pulmonary disease)     Prostate CA     Seizures      Family History   Problem Relation Name Age of Onset    Cancer Mother          cervical    Cancer Father          prostate, lung and leukemia     Past Surgical History:   Procedure Laterality Date    BRAIN SURGERY      EYE SURGERY Right     KNEE SURGERY Left      Review of Systems   Constitutional:  Positive for fatigue. Negative for appetite change.   HENT:  Negative for mouth sores, rhinorrhea and sore throat.    Eyes: Negative.    Respiratory:  Positive for shortness of breath (improved with iron supplement).    Cardiovascular: Negative.    Gastrointestinal:  Negative for constipation, diarrhea, nausea and vomiting.   Endocrine: Negative.    Genitourinary: Negative.    Musculoskeletal:  Positive for back pain (5/10).   Integumentary:  Negative.   Allergic/Immunologic: Negative.    Neurological:  Negative for weakness and numbness.   Hematological:  Bruises/bleeds easily.   Psychiatric/Behavioral:  Positive for sleep disturbance.        Medication List with Changes/Refills   Current Medications    CLONAZEPAM (KLONOPIN) 1 MG TABLET    Take 1 tablet (1 mg total) by mouth once daily.    FLUTICASONE-UMECLIDIN-VILANTER (TRELEGY ELLIPTA) 200-62.5-25 MCG INHALER    Inhale 1 puff into the lungs once daily.     Objective:     Vitals:    05/27/25 1512   BP: 133/80   Pulse: 64   Temp: 98 °F (36.7 °C)       Physical Exam  Vitals reviewed.   Constitutional:       Appearance: Normal appearance.   HENT:      Head: Normocephalic.      Mouth/Throat:      Comments:     Eyes:      Extraocular Movements: Extraocular  movements intact.      Pupils: Pupils are equal, round, and reactive to light.   Cardiovascular:      Rate and Rhythm: Normal rate and regular rhythm.      Heart sounds: Normal heart sounds.   Pulmonary:      Effort: Pulmonary effort is normal.      Breath sounds: Normal breath sounds.   Abdominal:      General: Bowel sounds are normal.      Palpations: Abdomen is soft.      Comments: rounded     Genitourinary:     Comments: deferred    Musculoskeletal:         General: Normal range of motion.      Cervical back: Normal range of motion and neck supple.   Skin:     General: Skin is warm and dry.   Neurological:      Mental Status: He is alert and oriented to person, place, and time.   Psychiatric:         Behavior: Behavior normal.         Thought Content: Thought content normal.          (0) Fully active, able to carry on all predisease performance without restriction  Assessment:     Problem List Items Addressed This Visit          Hematology    Easy bruising - Primary    Thrombocytopenia    Thrombophilia workup negative. Will check CBC, peripheral smear, and lupus anticoagulant. Will also check US abdomen.          Relevant Orders    CBC Auto Differential    Peripheral Smear Review for Hemolysis or Low Platelets    US Abdomen Complete    Lupus Anticoagulant     Plan:     Easy bruising    Thrombocytopenia  -     CBC Auto Differential; Future; Expected date: 05/27/2025  -     Peripheral Smear Review for Hemolysis or Low Platelets; Future; Expected date: 05/27/2025  -     US Abdomen Complete; Future; Expected date: 06/03/2025  -     Lupus Anticoagulant; Future; Expected date: 05/27/2025    Labs reviewed.   Check CBC, peripheral smear, lupus anticoagulant.   US abdomen to evaluate for possible sequestration.  Follow up in 2 weeks with results of workup.    Route Chart for Scheduling    Med Onc Chart Routing      Follow up with physician    Follow up with CARINE 2 weeks. with US, lupus anticoagulant, and peripheral smear    Infusion scheduling note    Injection scheduling note    Labs CBC and pathologist interp. differential   Scheduling:  Preferred lab:  Lab interval:     Imaging Other   US abdomen within 1 week   Pharmacy appointment No pharmacy appointment needed      Other referrals No nutrition appointment needed -        No additional referrals needed          I will review assessment/plan with collaborating physician.      TEOFILO Pitts         [1]   Social History  Socioeconomic History    Marital status: Single   Occupational History    Occupation:    Tobacco Use    Smoking status: Every Day     Current packs/day: 1.00     Average packs/day: 1 pack/day for 40.4 years (40.4 ttl pk-yrs)     Types: Cigarettes     Start date: 1985     Passive exposure: Never    Smokeless tobacco: Never   Substance and Sexual Activity    Alcohol use: Not Currently    Drug use: Never    Sexual activity: Not Currently   Social History Narrative    ** Merged History Encounter **

## 2025-05-27 ENCOUNTER — OFFICE VISIT (OUTPATIENT)
Dept: HEMATOLOGY/ONCOLOGY | Facility: CLINIC | Age: 60
End: 2025-05-27
Payer: MEDICARE

## 2025-05-27 ENCOUNTER — LAB VISIT (OUTPATIENT)
Dept: LAB | Facility: HOSPITAL | Age: 60
End: 2025-05-27
Attending: NURSE PRACTITIONER
Payer: MEDICARE

## 2025-05-27 ENCOUNTER — TELEPHONE (OUTPATIENT)
Dept: INFUSION THERAPY | Facility: HOSPITAL | Age: 60
End: 2025-05-27
Payer: MEDICARE

## 2025-05-27 VITALS
DIASTOLIC BLOOD PRESSURE: 80 MMHG | WEIGHT: 127.31 LBS | HEART RATE: 64 BPM | OXYGEN SATURATION: 94 % | BODY MASS INDEX: 16.87 KG/M2 | TEMPERATURE: 98 F | HEIGHT: 73 IN | SYSTOLIC BLOOD PRESSURE: 133 MMHG

## 2025-05-27 DIAGNOSIS — R23.3 EASY BRUISING: Primary | ICD-10-CM

## 2025-05-27 DIAGNOSIS — D69.6 THROMBOCYTOPENIA: ICD-10-CM

## 2025-05-27 LAB
ABSOLUTE EOSINOPHIL (OHS): 0.1 K/UL
ABSOLUTE MONOCYTE (OHS): 0.67 K/UL (ref 0.3–1)
ABSOLUTE NEUTROPHIL COUNT (OHS): 4.97 K/UL (ref 1.8–7.7)
BASOPHILS # BLD AUTO: 0.03 K/UL
BASOPHILS NFR BLD AUTO: 0.4 %
ERYTHROCYTE [DISTWIDTH] IN BLOOD BY AUTOMATED COUNT: 12.9 % (ref 11.5–14.5)
HCT VFR BLD AUTO: 44.4 % (ref 40–54)
HGB BLD-MCNC: 15.6 GM/DL (ref 14–18)
IMM GRANULOCYTES # BLD AUTO: 0.02 K/UL (ref 0–0.04)
IMM GRANULOCYTES NFR BLD AUTO: 0.3 % (ref 0–0.5)
LYMPHOCYTES # BLD AUTO: 1.32 K/UL (ref 1–4.8)
MCH RBC QN AUTO: 34.3 PG (ref 27–31)
MCHC RBC AUTO-ENTMCNC: 35.1 G/DL (ref 32–36)
MCV RBC AUTO: 98 FL (ref 82–98)
NUCLEATED RBC (/100WBC) (OHS): 0 /100 WBC
PLATELET # BLD AUTO: 134 K/UL (ref 150–450)
PMV BLD AUTO: 10.2 FL (ref 9.2–12.9)
RBC # BLD AUTO: 4.55 M/UL (ref 4.6–6.2)
RELATIVE EOSINOPHIL (OHS): 1.4 %
RELATIVE LYMPHOCYTE (OHS): 18.6 % (ref 18–48)
RELATIVE MONOCYTE (OHS): 9.4 % (ref 4–15)
RELATIVE NEUTROPHIL (OHS): 69.9 % (ref 38–73)
WBC # BLD AUTO: 7.11 K/UL (ref 3.9–12.7)

## 2025-05-27 PROCEDURE — 1159F MED LIST DOCD IN RCRD: CPT | Mod: CPTII,S$GLB,, | Performed by: NURSE PRACTITIONER

## 2025-05-27 PROCEDURE — 3008F BODY MASS INDEX DOCD: CPT | Mod: CPTII,S$GLB,, | Performed by: NURSE PRACTITIONER

## 2025-05-27 PROCEDURE — 3079F DIAST BP 80-89 MM HG: CPT | Mod: CPTII,S$GLB,, | Performed by: NURSE PRACTITIONER

## 2025-05-27 PROCEDURE — 85598 HEXAGNAL PHOSPH PLTLT NEUTRL: CPT

## 2025-05-27 PROCEDURE — 85670 THROMBIN TIME PLASMA: CPT

## 2025-05-27 PROCEDURE — 85613 RUSSELL VIPER VENOM DILUTED: CPT | Mod: 59

## 2025-05-27 PROCEDURE — 99214 OFFICE O/P EST MOD 30 MIN: CPT | Mod: S$GLB,,, | Performed by: NURSE PRACTITIONER

## 2025-05-27 PROCEDURE — 85610 PROTHROMBIN TIME: CPT

## 2025-05-27 PROCEDURE — 1160F RVW MEDS BY RX/DR IN RCRD: CPT | Mod: CPTII,S$GLB,, | Performed by: NURSE PRACTITIONER

## 2025-05-27 PROCEDURE — 85025 COMPLETE CBC W/AUTO DIFF WBC: CPT

## 2025-05-27 PROCEDURE — 85060 BLOOD SMEAR INTERPRETATION: CPT | Mod: ,,, | Performed by: STUDENT IN AN ORGANIZED HEALTH CARE EDUCATION/TRAINING PROGRAM

## 2025-05-27 PROCEDURE — 3075F SYST BP GE 130 - 139MM HG: CPT | Mod: CPTII,S$GLB,, | Performed by: NURSE PRACTITIONER

## 2025-05-27 PROCEDURE — 36415 COLL VENOUS BLD VENIPUNCTURE: CPT

## 2025-05-27 PROCEDURE — 85520 HEPARIN ASSAY: CPT

## 2025-05-27 PROCEDURE — 99999 PR PBB SHADOW E&M-EST. PATIENT-LVL III: CPT | Mod: PBBFAC,,, | Performed by: NURSE PRACTITIONER

## 2025-05-27 NOTE — ASSESSMENT & PLAN NOTE
Thrombophilia workup negative. Will check CBC, peripheral smear, and lupus anticoagulant. Will also check US abdomen.

## 2025-05-28 LAB — PATHOLOGIST REVIEW - CBC/DIFF (OHS): NORMAL

## 2025-06-01 LAB
ANTI-XA QUALITATIVE INTERPRETATION: ABNORMAL
ANTICOAGULANT MEDICATION NEUTRALIZATION: ABNORMAL
AR DRVVT 1:1 MIX RATIO: 1.1
AR DRVVT CONFIRMATION RATIO: 1.05
AR HEXAGONAL PHOSPHOLIPID CONFIRMATION: 7.1 S
AR NEUTRALIZED PTT-LA RATIO: ABNORMAL
AR THROMBIN TIME (TT): 16.3 S
DRVVT SCREEN RATIO: 1.3
LA 3 SCREEN W REFLEX-IMP: ABNORMAL
NEUTRALIZED DRVVT SCREEN RATIO: ABNORMAL
PROTHROMBIN TIME: 14.2 S
PTT-LA RATIO: 1.35

## 2025-06-05 ENCOUNTER — HOSPITAL ENCOUNTER (OUTPATIENT)
Dept: RADIOLOGY | Facility: HOSPITAL | Age: 60
Discharge: HOME | End: 2025-06-05
Attending: NURSE PRACTITIONER
Payer: MEDICARE

## 2025-06-05 DIAGNOSIS — D69.6 THROMBOCYTOPENIA: ICD-10-CM

## 2025-06-05 PROCEDURE — 76700 US EXAM ABDOM COMPLETE: CPT | Mod: TC

## 2025-06-05 PROCEDURE — 76700 US EXAM ABDOM COMPLETE: CPT | Mod: 26,,, | Performed by: RADIOLOGY

## 2025-06-06 NOTE — PROGRESS NOTES
Subjective:       Patient ID: Moe Loredo is a 60 y.o. male.    Chief Complaint:   1. Easy bruising        2. Thrombocytopenia          Current Treatment:       Treatment History:    HPI: This is a 60 year old  male with osteoarthritis, brain aneurysm s/p repair, COPD, schwannoma of spinal cord, and neuropathy who is seen in Hem/Onc after being referred by Dr. eBnita Parsons for easy bruising. He was seen initially in 5/2025 with complaints of fatigue, back pain, easy bruising, and SOB from COPD. He stated easy bruising started over the last year. He stated he never used to bruise but noticed that if brushed up against something, he developed a bruise pretty quickly. He reported history of polycythemia vera; however, blood results in Epic do not indicate elevated RBC.     He admited to smoking 1.5 packs a day since he was 14 years old.     vWF, Factor VIII, IX, XI, XII WNL. Mixing study not done due to mildly prolonged PTT.     Interval History: Patient presents for follow up on labs. He presents alone to review recent testing. US abdomen showed multiple hepatic cysts and cholelithiasis with normal spleen. No sequestration. Peripheral smear noted mild to moderate thrombocytopenia with no clumping. He states he was told by Neurology and another provider that he lacks the ability for his platelets to stick so he was taken off cholesterol medication in light of the lack of plaque in his blood vessels. Lupus anticoagulant not detected. dRVVT screen ratio and PTT-LA ratio elevated. Discussed the possibility of ITP; discussed a pulse dose of steroids. Also discussed that, because his plt count is near normal, treatment would be to monitor his counts which is what we would do even if we didn't do pulse dosing of steroids. He opts to proceed with steroids. Will also check fibrinogen and factors II and X with repeat CBC after 4 days of steroids.     Reviewed labs with patient:   CBC:   Recent Labs   Lab  05/27/25  1613   WBC 7.11   RBC 4.55 L   HGB 15.6   HCT 44.4   Platelet Count 134 L   MCV 98   MCH 34.3 H   MCHC 35.1     CMP:  Recent Labs   Lab 08/16/24  1422   Glucose 104   Calcium 9.3   Albumin 3.5   Total Protein 6.5   Sodium 139   Potassium 3.7   CO2 24   Chloride 105   BUN 10   Creatinine 1.1   Alkaline Phosphatase 84   ALT 10   AST 14   Total Bilirubin 0.3     Lab Results   Component Value Date    [id number] 701 05/01/2025     Lab Results   Component Value Date    FOLATE 5.9 05/01/2025     Lab Results   Component Value Date    APTT 32.5 (H) 05/01/2025     Lab Results   Component Value Date    TSH 0.972 05/01/2025     Social History[1]  Past Medical History:   Diagnosis Date    Aneurysm, cerebral     COPD (chronic obstructive pulmonary disease)     Prostate CA     Seizures      Family History   Problem Relation Name Age of Onset    Cancer Mother          cervical    Cancer Father          prostate, lung and leukemia     Past Surgical History:   Procedure Laterality Date    BRAIN SURGERY      EYE SURGERY Right     KNEE SURGERY Left      Review of Systems   Constitutional:  Positive for fatigue. Negative for appetite change.   HENT:  Negative for mouth sores, rhinorrhea and sore throat.    Eyes: Negative.    Respiratory:  Positive for shortness of breath (improved with iron supplement).    Cardiovascular: Negative.    Gastrointestinal:  Negative for constipation, diarrhea, nausea and vomiting.   Endocrine: Negative.    Genitourinary: Negative.    Musculoskeletal:  Positive for back pain (5/10).   Integumentary:  Negative.   Allergic/Immunologic: Negative.    Neurological:  Negative for weakness and numbness.   Hematological:  Bruises/bleeds easily.   Psychiatric/Behavioral:  Positive for sleep disturbance.        Medication List with Changes/Refills   Current Medications    CLONAZEPAM (KLONOPIN) 1 MG TABLET    Take 1 tablet (1 mg total) by mouth once daily.    FLUTICASONE-UMECLIDIN-VILANTER (TRELEGY  ELLIPTA) 200-62.5-25 MCG INHALER    Inhale 1 puff into the lungs once daily.     Objective:     Vitals:    06/10/25 1432   BP: 110/74   Pulse: 88   Temp: 97.4 °F (36.3 °C)     Physical Exam  Vitals reviewed.   Constitutional:       Appearance: Normal appearance.   HENT:      Head: Normocephalic.      Mouth/Throat:      Comments:     Eyes:      Extraocular Movements: Extraocular movements intact.      Pupils: Pupils are equal, round, and reactive to light.   Cardiovascular:      Rate and Rhythm: Normal rate and regular rhythm.      Heart sounds: Normal heart sounds.   Pulmonary:      Effort: Pulmonary effort is normal.      Breath sounds: Normal breath sounds.   Abdominal:      General: Bowel sounds are normal.      Palpations: Abdomen is soft.      Comments: rounded     Genitourinary:     Comments: deferred    Musculoskeletal:         General: Normal range of motion.      Cervical back: Normal range of motion and neck supple.   Skin:     General: Skin is warm and dry.   Neurological:      Mental Status: He is alert and oriented to person, place, and time.   Psychiatric:         Behavior: Behavior normal.         Thought Content: Thought content normal.        (0) Fully active, able to carry on all predisease performance without restriction  Assessment:     Problem List Items Addressed This Visit          Hematology    Easy bruising - Primary    Thrombocytopenia    Thrombophilia workup negative. Will check CBC, peripheral smear, and lupus anticoagulant. Will also check US abdomen.           Plan:     Easy bruising    Thrombocytopenia    Labs reviewed.   Take prednisone 40mg po daily x 4 days then stop; prescription sent to patient's pharmacy.   Check CBC, Factors I, II, and X in 7 days.   Follow up in 2 weeks with results of workup.    Route Chart for Scheduling    Med Onc Chart Routing      Follow up with physician    Follow up with CARINE 2 weeks. with lab results   Infusion scheduling note    Injection scheduling  note    Labs CBC and other   Scheduling:  Preferred lab:  Lab interval:  and Factors I, II, & X in 7 days   Imaging None      Pharmacy appointment No pharmacy appointment needed      Other referrals No nutrition appointment needed -        No additional referrals needed          I will review assessment/plan with collaborating physician.      TEOFILO Pitts           [1]  Social History  Socioeconomic History    Marital status:    Occupational History    Occupation:    Tobacco Use    Smoking status: Every Day     Current packs/day: 1.00     Average packs/day: 1 pack/day for 40.4 years (40.4 ttl pk-yrs)     Types: Cigarettes     Start date: 1985     Passive exposure: Never    Smokeless tobacco: Never   Substance and Sexual Activity    Alcohol use: Not Currently    Drug use: Never    Sexual activity: Not Currently   Social History Narrative    ** Merged History Encounter **

## 2025-06-10 ENCOUNTER — OFFICE VISIT (OUTPATIENT)
Dept: HEMATOLOGY/ONCOLOGY | Facility: CLINIC | Age: 60
End: 2025-06-10
Payer: MEDICARE

## 2025-06-10 VITALS
TEMPERATURE: 97 F | HEIGHT: 73 IN | BODY MASS INDEX: 16.81 KG/M2 | OXYGEN SATURATION: 93 % | HEART RATE: 88 BPM | SYSTOLIC BLOOD PRESSURE: 110 MMHG | DIASTOLIC BLOOD PRESSURE: 74 MMHG | WEIGHT: 126.81 LBS

## 2025-06-10 DIAGNOSIS — D69.6 THROMBOCYTOPENIA: ICD-10-CM

## 2025-06-10 DIAGNOSIS — R23.3 EASY BRUISING: Primary | ICD-10-CM

## 2025-06-10 PROCEDURE — 99999 PR PBB SHADOW E&M-EST. PATIENT-LVL III: CPT | Mod: PBBFAC,,, | Performed by: NURSE PRACTITIONER

## 2025-06-10 RX ORDER — PREDNISONE 20 MG/1
20 TABLET ORAL DAILY
Qty: 8 TABLET | Refills: 0 | Status: SHIPPED | OUTPATIENT
Start: 2025-06-10

## 2025-06-16 ENCOUNTER — TELEPHONE (OUTPATIENT)
Dept: HEMATOLOGY/ONCOLOGY | Facility: CLINIC | Age: 60
End: 2025-06-16
Payer: MEDICARE

## 2025-06-17 ENCOUNTER — LAB VISIT (OUTPATIENT)
Dept: LAB | Facility: HOSPITAL | Age: 60
End: 2025-06-17
Attending: NURSE PRACTITIONER
Payer: MEDICARE

## 2025-06-17 DIAGNOSIS — D69.6 THROMBOCYTOPENIA: ICD-10-CM

## 2025-06-17 DIAGNOSIS — R23.3 EASY BRUISING: ICD-10-CM

## 2025-06-17 LAB
ABSOLUTE EOSINOPHIL (OHS): 0.09 K/UL
ABSOLUTE MONOCYTE (OHS): 0.59 K/UL (ref 0.3–1)
ABSOLUTE NEUTROPHIL COUNT (OHS): 4.18 K/UL (ref 1.8–7.7)
BASOPHILS # BLD AUTO: 0.04 K/UL
BASOPHILS NFR BLD AUTO: 0.6 %
ERYTHROCYTE [DISTWIDTH] IN BLOOD BY AUTOMATED COUNT: 12.8 % (ref 11.5–14.5)
FIBRINOGEN PPP-MCNC: 270 MG/DL (ref 182–400)
HCT VFR BLD AUTO: 46.6 % (ref 40–54)
HGB BLD-MCNC: 15.7 GM/DL (ref 14–18)
IMM GRANULOCYTES # BLD AUTO: 0.02 K/UL (ref 0–0.04)
IMM GRANULOCYTES NFR BLD AUTO: 0.3 % (ref 0–0.5)
LYMPHOCYTES # BLD AUTO: 1.37 K/UL (ref 1–4.8)
MCH RBC QN AUTO: 33.6 PG (ref 27–31)
MCHC RBC AUTO-ENTMCNC: 33.7 G/DL (ref 32–36)
MCV RBC AUTO: 100 FL (ref 82–98)
NUCLEATED RBC (/100WBC) (OHS): 0 /100 WBC
PLATELET # BLD AUTO: 126 K/UL (ref 150–450)
PMV BLD AUTO: 10.3 FL (ref 9.2–12.9)
RBC # BLD AUTO: 4.67 M/UL (ref 4.6–6.2)
RELATIVE EOSINOPHIL (OHS): 1.4 %
RELATIVE LYMPHOCYTE (OHS): 21.8 % (ref 18–48)
RELATIVE MONOCYTE (OHS): 9.4 % (ref 4–15)
RELATIVE NEUTROPHIL (OHS): 66.5 % (ref 38–73)
WBC # BLD AUTO: 6.29 K/UL (ref 3.9–12.7)

## 2025-06-17 PROCEDURE — 85260 CLOT FACTOR X STUART-POWER: CPT

## 2025-06-17 PROCEDURE — 36415 COLL VENOUS BLD VENIPUNCTURE: CPT | Mod: PO

## 2025-06-17 PROCEDURE — 85210 CLOT FACTOR II PROTHROM SPEC: CPT

## 2025-06-17 PROCEDURE — 85025 COMPLETE CBC W/AUTO DIFF WBC: CPT

## 2025-06-17 PROCEDURE — 85384 FIBRINOGEN ACTIVITY: CPT

## 2025-06-18 LAB
FACT X ACT/NOR PPP: 108 % (ref 75–196)
PROTHROM ACT/NOR PPP: 98 % (ref 70–130)

## 2025-06-20 NOTE — PROGRESS NOTES
Subjective:       Patient ID: Moe Loredo is a 60 y.o. male.    Chief Complaint:   1. Easy bruising  predniSONE (DELTASONE) 20 MG tablet      2. Thrombocytopenia  predniSONE (DELTASONE) 20 MG tablet        Current Treatment:       Treatment History:    HPI: This is a 60 year old  male with osteoarthritis, brain aneurysm s/p repair, COPD, schwannoma of spinal cord, and neuropathy who is seen in Hem/Onc after being referred by Dr. Benita Parsons for easy bruising. He was seen initially in 5/2025 with complaints of fatigue, back pain, easy bruising, and SOB from COPD. He stated easy bruising started over the last year. He stated he never used to bruise but noticed that if brushed up against something, he developed a bruise pretty quickly. He reported history of polycythemia vera; however, blood results in Epic do not indicate elevated RBC.     He admited to smoking 1.5 packs a day since he was 14 years old.     vWF, Factor VIII, IX, XI, XII WNL. Mixing study not done due to mildly prolonged PTT. US abdomen showed multiple hepatic cysts and cholelithiasis with normal spleen. No sequestration. Peripheral smear noted mild to moderate thrombocytopenia with no clumping.    Interval History: Patient presents for follow up on labs. He presents alone with no new complaints.   Fibrinogen and Factors II & X WNL. No increase in plt count after pulse dose steroids. He states he took 1 tablet daily per the instructions on his prescription bottle. Reviewed prescription with patient; instructed him to take 2 tablets daily for 4 days then stop. Will retry steroid pulse dose and recheck CBC in 7 days. Will have him follow up in 2 weeks with results.     Reviewed labs with patient:   CBC:   Recent Labs   Lab 06/17/25  1157   WBC 6.29   RBC 4.67   HGB 15.7   HCT 46.6   Platelet Count 126 L    H   MCH 33.6 H   MCHC 33.7     CMP:  Recent Labs   Lab 08/16/24  1422   Glucose 104   Calcium 9.3   Albumin 3.5    Total Protein 6.5   Sodium 139   Potassium 3.7   CO2 24   Chloride 105   BUN 10   Creatinine 1.1   Alkaline Phosphatase 84   ALT 10   AST 14   Total Bilirubin 0.3     Social History[1]  Past Medical History:   Diagnosis Date    Aneurysm, cerebral     COPD (chronic obstructive pulmonary disease)     Prostate CA     Seizures      Family History   Problem Relation Name Age of Onset    Cancer Mother          cervical    Cancer Father          prostate, lung and leukemia     Past Surgical History:   Procedure Laterality Date    BRAIN SURGERY      EYE SURGERY Right     KNEE SURGERY Left      Review of Systems   Constitutional:  Positive for fatigue. Negative for appetite change.   HENT:  Negative for mouth sores, rhinorrhea and sore throat.    Eyes: Negative.    Respiratory:  Positive for shortness of breath (improved with iron supplement).    Cardiovascular: Negative.    Gastrointestinal:  Negative for constipation, diarrhea, nausea and vomiting.   Endocrine: Negative.    Genitourinary: Negative.    Musculoskeletal:  Positive for back pain (5/10).   Integumentary:  Negative.   Allergic/Immunologic: Negative.    Neurological:  Negative for weakness and numbness.   Hematological:  Bruises/bleeds easily.   Psychiatric/Behavioral:  Positive for sleep disturbance.        Medication List with Changes/Refills   Current Medications    CLONAZEPAM (KLONOPIN) 1 MG TABLET    Take 1 tablet (1 mg total) by mouth once daily.    FLUTICASONE-UMECLIDIN-VILANTER (TRELEGY ELLIPTA) 200-62.5-25 MCG INHALER    Inhale 1 puff into the lungs once daily.   Changed and/or Refilled Medications    Modified Medication Previous Medication    PREDNISONE (DELTASONE) 20 MG TABLET predniSONE (DELTASONE) 20 MG tablet       Take 1 tablet (20 mg total) by mouth once daily.    Take 1 tablet (20 mg total) by mouth once daily.     Objective:     Vitals:    06/24/25 1050   BP: 112/76   Pulse: 88   Temp: 97.5 °F (36.4 °C)     Physical Exam  Vitals reviewed.    Constitutional:       Appearance: Normal appearance.   HENT:      Head: Normocephalic.      Mouth/Throat:      Comments:     Eyes:      Extraocular Movements: Extraocular movements intact.      Pupils: Pupils are equal, round, and reactive to light.   Cardiovascular:      Rate and Rhythm: Normal rate and regular rhythm.      Heart sounds: Normal heart sounds.   Pulmonary:      Effort: Pulmonary effort is normal.      Breath sounds: Normal breath sounds.   Abdominal:      General: Bowel sounds are normal.      Palpations: Abdomen is soft.      Comments: rounded     Genitourinary:     Comments: deferred    Musculoskeletal:         General: Normal range of motion.      Cervical back: Normal range of motion and neck supple.   Skin:     General: Skin is warm and dry.   Neurological:      Mental Status: He is alert and oriented to person, place, and time.   Psychiatric:         Behavior: Behavior normal.         Thought Content: Thought content normal.          (0) Fully active, able to carry on all predisease performance without restriction  Assessment:     Problem List Items Addressed This Visit          Hematology    Easy bruising - Primary    Relevant Medications    predniSONE (DELTASONE) 20 MG tablet    Thrombocytopenia    Thrombophilia workup negative. Peripheral smear and lupus anticoagulant negative. Negative US abdomen. Will try steroid pulse dose.          Relevant Medications    predniSONE (DELTASONE) 20 MG tablet     Plan:     Easy bruising  -     predniSONE (DELTASONE) 20 MG tablet; Take 1 tablet (20 mg total) by mouth once daily.  Dispense: 8 tablet; Refill: 0    Thrombocytopenia  -     predniSONE (DELTASONE) 20 MG tablet; Take 1 tablet (20 mg total) by mouth once daily.  Dispense: 8 tablet; Refill: 0    Labs reviewed; plts stable.   Repeat steroid pulse dose; prescription sent to patient's pharmacy.   Patient instructed to take 2 tablets daily for 4 days then stop.   Will check CBC in 7 days on 6/30/2025.    Follow up in 2 weeks with results of workup.    Route Chart for Scheduling    Med Onc Chart Routing      Follow up with physician    Follow up with CARINE 2 weeks.   Infusion scheduling note    Injection scheduling note    Labs CBC   Scheduling:  Preferred lab:  Lab interval:  in 7 days on 6/30/2025   Imaging None      Pharmacy appointment No pharmacy appointment needed      Other referrals No nutrition appointment needed -        No additional referrals needed          I will review assessment/plan with collaborating physician.      TEOFILO Pitts             [1]   Social History  Socioeconomic History    Marital status:    Occupational History    Occupation:    Tobacco Use    Smoking status: Every Day     Current packs/day: 1.00     Average packs/day: 1 pack/day for 40.5 years (40.5 ttl pk-yrs)     Types: Cigarettes     Start date: 1985     Passive exposure: Never    Smokeless tobacco: Never   Substance and Sexual Activity    Alcohol use: Not Currently    Drug use: Never    Sexual activity: Not Currently   Social History Narrative    ** Merged History Encounter **

## 2025-06-20 NOTE — ASSESSMENT & PLAN NOTE
Thrombophilia workup negative. Peripheral smear and lupus anticoagulant negative. Negative US abdomen. Will try steroid pulse dose.

## 2025-06-24 ENCOUNTER — OFFICE VISIT (OUTPATIENT)
Dept: HEMATOLOGY/ONCOLOGY | Facility: CLINIC | Age: 60
End: 2025-06-24
Payer: MEDICARE

## 2025-06-24 VITALS
BODY MASS INDEX: 16.65 KG/M2 | TEMPERATURE: 98 F | HEART RATE: 88 BPM | OXYGEN SATURATION: 94 % | DIASTOLIC BLOOD PRESSURE: 76 MMHG | SYSTOLIC BLOOD PRESSURE: 112 MMHG | HEIGHT: 73 IN | WEIGHT: 125.63 LBS

## 2025-06-24 DIAGNOSIS — D69.6 THROMBOCYTOPENIA: ICD-10-CM

## 2025-06-24 DIAGNOSIS — R23.3 EASY BRUISING: Primary | ICD-10-CM

## 2025-06-24 PROCEDURE — 99999 PR PBB SHADOW E&M-EST. PATIENT-LVL III: CPT | Mod: PBBFAC,,, | Performed by: NURSE PRACTITIONER

## 2025-06-24 RX ORDER — PREDNISONE 20 MG/1
20 TABLET ORAL DAILY
Qty: 8 TABLET | Refills: 0 | Status: SHIPPED | OUTPATIENT
Start: 2025-06-24

## 2025-06-30 ENCOUNTER — LAB VISIT (OUTPATIENT)
Dept: LAB | Facility: HOSPITAL | Age: 60
End: 2025-06-30
Attending: NURSE PRACTITIONER
Payer: MEDICARE

## 2025-06-30 DIAGNOSIS — R23.3 EASY BRUISING: ICD-10-CM

## 2025-06-30 DIAGNOSIS — D69.6 THROMBOCYTOPENIA: ICD-10-CM

## 2025-06-30 LAB
ABSOLUTE EOSINOPHIL (OHS): 0.07 K/UL
ABSOLUTE MONOCYTE (OHS): 0.7 K/UL (ref 0.3–1)
ABSOLUTE NEUTROPHIL COUNT (OHS): 5.75 K/UL (ref 1.8–7.7)
BASOPHILS # BLD AUTO: 0.06 K/UL
BASOPHILS NFR BLD AUTO: 0.7 %
ERYTHROCYTE [DISTWIDTH] IN BLOOD BY AUTOMATED COUNT: 12.3 % (ref 11.5–14.5)
HCT VFR BLD AUTO: 46.9 % (ref 40–54)
HGB BLD-MCNC: 15.8 GM/DL (ref 14–18)
IMM GRANULOCYTES # BLD AUTO: 0.02 K/UL (ref 0–0.04)
IMM GRANULOCYTES NFR BLD AUTO: 0.2 % (ref 0–0.5)
LYMPHOCYTES # BLD AUTO: 1.42 K/UL (ref 1–4.8)
MCH RBC QN AUTO: 33.2 PG (ref 27–31)
MCHC RBC AUTO-ENTMCNC: 33.7 G/DL (ref 32–36)
MCV RBC AUTO: 99 FL (ref 82–98)
NUCLEATED RBC (/100WBC) (OHS): 0 /100 WBC
PLATELET # BLD AUTO: 144 K/UL (ref 150–450)
PMV BLD AUTO: 10.4 FL (ref 9.2–12.9)
RBC # BLD AUTO: 4.76 M/UL (ref 4.6–6.2)
RELATIVE EOSINOPHIL (OHS): 0.9 %
RELATIVE LYMPHOCYTE (OHS): 17.7 % (ref 18–48)
RELATIVE MONOCYTE (OHS): 8.7 % (ref 4–15)
RELATIVE NEUTROPHIL (OHS): 71.8 % (ref 38–73)
WBC # BLD AUTO: 8.02 K/UL (ref 3.9–12.7)

## 2025-06-30 PROCEDURE — 85025 COMPLETE CBC W/AUTO DIFF WBC: CPT

## 2025-06-30 PROCEDURE — 36415 COLL VENOUS BLD VENIPUNCTURE: CPT | Mod: PO

## 2025-07-07 NOTE — PROGRESS NOTES
Subjective:       Patient ID: Moe Loredo is a 60 y.o. male.    Chief Complaint:   1. Easy bruising        2. Thrombocytopenia        3. Chronic ITP (idiopathic thrombocytopenia)  CBC Auto Differential    Comprehensive Metabolic Panel        Current Treatment:  Observation     Treatment History:    HPI: This is a 60 year old  male with osteoarthritis, brain aneurysm s/p repair, COPD, schwannoma of spinal cord, and neuropathy who is seen in Hem/Onc after being referred by Dr. Benita Parsons for easy bruising. He was seen initially in 5/2025 with complaints of fatigue, back pain, easy bruising, and SOB from COPD. He stated easy bruising started over the last year. He stated he never used to bruise but noticed that if brushed up against something, he developed a bruise pretty quickly. He reported history of polycythemia vera; however, blood results in Epic do not indicate elevated RBC.     He admited to smoking 1.5 packs a day since he was 14 years old.     vWF, Factor VIII, IX, XI, XII WNL. Mixing study not done due to mildly prolonged PTT. US abdomen showed multiple hepatic cysts and cholelithiasis with normal spleen. No sequestration. Peripheral smear noted mild to moderate thrombocytopenia with no clumping.    Of note, he states he was diagnosed with prostate cancer 11 years ago and underwent treatment with CBD oil,THC + Lemon oil. He states PET did not find any cancer.      Interval History: Patient presents for follow up on labs. He presents alone with no new complaints; he states he hurts all the time. Plts improved to 144k with steroid pulse dose. Explained ITP to the patient and discussed that no treatment is indicated at this time. Will have him follow up in 6 months with labs. 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      Reviewed labs with patient:   CBC:   Recent Labs   Lab 06/30/25  1339   WBC 8.02    RBC 4.76   HGB 15.8   HCT 46.9   Platelet Count 144 L   MCV 99 H   MCH 33.2 H   MCHC 33.7     CMP:  Recent Labs   Lab 08/16/24  1422   Glucose 104   Calcium 9.3   Albumin 3.5   Total Protein 6.5   Sodium 139   Potassium 3.7   CO2 24   Chloride 105   BUN 10   Creatinine 1.1   Alkaline Phosphatase 84   ALT 10   AST 14   Total Bilirubin 0.3     Social History[1]  Past Medical History:   Diagnosis Date    Aneurysm, cerebral     COPD (chronic obstructive pulmonary disease)     Prostate CA     Seizures      Family History   Problem Relation Name Age of Onset    Cancer Mother          cervical    Cancer Father          prostate, lung and leukemia     Past Surgical History:   Procedure Laterality Date    BRAIN SURGERY      EYE SURGERY Right     KNEE SURGERY Left      Review of Systems   Constitutional:  Positive for fatigue. Negative for appetite change.   HENT:  Negative for mouth sores, rhinorrhea and sore throat.    Eyes: Negative.    Respiratory:  Positive for shortness of breath (improved with iron supplement).    Cardiovascular: Negative.    Gastrointestinal:  Negative for constipation, diarrhea, nausea and vomiting.   Endocrine: Negative.    Genitourinary: Negative.    Musculoskeletal:  Positive for back pain (5/10).   Integumentary:  Negative.   Allergic/Immunologic: Negative.    Neurological:  Negative for weakness and numbness.   Hematological:  Bruises/bleeds easily.   Psychiatric/Behavioral:  Positive for sleep disturbance.        Medication List with Changes/Refills   Current Medications    CLONAZEPAM (KLONOPIN) 1 MG TABLET    Take 1 tablet (1 mg total) by mouth once daily.    FLUTICASONE-UMECLIDIN-VILANTER (TRELEGY ELLIPTA) 200-62.5-25 MCG INHALER    Inhale 1 puff into the lungs once daily.    PREDNISONE (DELTASONE) 20 MG TABLET    Take 1 tablet (20 mg total) by mouth once daily.     Objective:     Vitals:    07/08/25 1035   BP: 109/75   Pulse: 88   Temp: 97.2 °F (36.2 °C)     Physical Exam  Vitals reviewed.    Constitutional:       Appearance: Normal appearance.   HENT:      Head: Normocephalic.      Mouth/Throat:      Comments:     Eyes:      Extraocular Movements: Extraocular movements intact.      Pupils: Pupils are equal, round, and reactive to light.      Comments: Glasses       Cardiovascular:      Rate and Rhythm: Normal rate and regular rhythm.      Heart sounds: Normal heart sounds.   Pulmonary:      Effort: Pulmonary effort is normal.      Breath sounds: Normal breath sounds.   Abdominal:      General: Bowel sounds are normal.      Palpations: Abdomen is soft.      Comments: rounded     Genitourinary:     Comments: deferred    Musculoskeletal:         General: Normal range of motion.      Cervical back: Normal range of motion and neck supple.   Skin:     General: Skin is warm and dry.   Neurological:      Mental Status: He is alert and oriented to person, place, and time.   Psychiatric:         Behavior: Behavior normal.         Thought Content: Thought content normal.          (0) Fully active, able to carry on all predisease performance without restriction  Assessment:     Problem List Items Addressed This Visit          Hematology    Easy bruising - Primary    Thrombocytopenia    Thrombophilia workup negative. Peripheral smear and lupus anticoagulant negative. Negative US abdomen. Plts improved with steroid pulse dose indicating ITP.           Other Visit Diagnoses         Chronic ITP (idiopathic thrombocytopenia)        Relevant Orders    CBC Auto Differential    Comprehensive Metabolic Panel          Plan:     Easy bruising    Thrombocytopenia    Chronic ITP (idiopathic thrombocytopenia)  -     CBC Auto Differential; Future; Expected date: 01/08/2026  -     Comprehensive Metabolic Panel; Future; Expected date: 01/08/2026    Labs reviewed; plts improved with pulse dose steroids indicating ITP.   Follow up in 6 months with CBC and Comprehensive Metabolic Panel.    Route Chart for Scheduling    Med Onc Chart  Routing      Follow up with physician    Follow up with [facility] 6 months.   Infusion scheduling note    Injection scheduling note    Labs CBC and CMP   Scheduling:  Preferred lab:  Lab interval:  in 6 months   Imaging None      Pharmacy appointment No pharmacy appointment needed      Other referrals No nutrition appointment needed -        No additional referrals needed          I will review assessment/plan with collaborating physician.      TEOFILO Pitts               [1]   Social History  Socioeconomic History    Marital status:    Occupational History    Occupation:    Tobacco Use    Smoking status: Every Day     Current packs/day: 1.00     Average packs/day: 1 pack/day for 40.5 years (40.5 ttl pk-yrs)     Types: Cigarettes     Start date: 1985     Passive exposure: Never    Smokeless tobacco: Never   Substance and Sexual Activity    Alcohol use: Not Currently    Drug use: Never    Sexual activity: Not Currently   Social History Narrative    ** Merged History Encounter **

## 2025-07-07 NOTE — ASSESSMENT & PLAN NOTE
Thrombophilia workup negative. Peripheral smear and lupus anticoagulant negative. Negative US abdomen. Plts improved with steroid pulse dose indicating ITP.

## 2025-07-08 ENCOUNTER — OFFICE VISIT (OUTPATIENT)
Dept: HEMATOLOGY/ONCOLOGY | Facility: CLINIC | Age: 60
End: 2025-07-08
Payer: MEDICARE

## 2025-07-08 VITALS
OXYGEN SATURATION: 95 % | TEMPERATURE: 97 F | SYSTOLIC BLOOD PRESSURE: 109 MMHG | HEART RATE: 88 BPM | HEIGHT: 73 IN | BODY MASS INDEX: 16.67 KG/M2 | DIASTOLIC BLOOD PRESSURE: 75 MMHG | WEIGHT: 125.81 LBS

## 2025-07-08 DIAGNOSIS — R23.3 EASY BRUISING: Primary | ICD-10-CM

## 2025-07-08 DIAGNOSIS — D69.3 CHRONIC ITP (IDIOPATHIC THROMBOCYTOPENIA): ICD-10-CM

## 2025-07-08 DIAGNOSIS — D69.6 THROMBOCYTOPENIA: ICD-10-CM

## 2025-07-08 PROCEDURE — 99999 PR PBB SHADOW E&M-EST. PATIENT-LVL III: CPT | Mod: PBBFAC,,, | Performed by: NURSE PRACTITIONER
